# Patient Record
Sex: FEMALE | Race: WHITE | URBAN - METROPOLITAN AREA
[De-identification: names, ages, dates, MRNs, and addresses within clinical notes are randomized per-mention and may not be internally consistent; named-entity substitution may affect disease eponyms.]

---

## 2017-11-13 ENCOUNTER — OUTPATIENT (OUTPATIENT)
Dept: OUTPATIENT SERVICES | Facility: HOSPITAL | Age: 72
LOS: 1 days | Discharge: HOME | End: 2017-11-13

## 2017-11-13 DIAGNOSIS — Z12.31 ENCOUNTER FOR SCREENING MAMMOGRAM FOR MALIGNANT NEOPLASM OF BREAST: ICD-10-CM

## 2018-12-04 ENCOUNTER — OUTPATIENT (OUTPATIENT)
Dept: OUTPATIENT SERVICES | Facility: HOSPITAL | Age: 73
LOS: 1 days | Discharge: HOME | End: 2018-12-04

## 2018-12-04 DIAGNOSIS — Z12.31 ENCOUNTER FOR SCREENING MAMMOGRAM FOR MALIGNANT NEOPLASM OF BREAST: ICD-10-CM

## 2020-01-06 ENCOUNTER — OUTPATIENT (OUTPATIENT)
Dept: OUTPATIENT SERVICES | Facility: HOSPITAL | Age: 75
LOS: 1 days | Discharge: HOME | End: 2020-01-06
Payer: MEDICARE

## 2020-01-06 DIAGNOSIS — Z12.31 ENCOUNTER FOR SCREENING MAMMOGRAM FOR MALIGNANT NEOPLASM OF BREAST: ICD-10-CM

## 2020-01-06 PROCEDURE — 77067 SCR MAMMO BI INCL CAD: CPT | Mod: 26

## 2020-01-06 PROCEDURE — 77063 BREAST TOMOSYNTHESIS BI: CPT | Mod: 26

## 2021-01-26 ENCOUNTER — OUTPATIENT (OUTPATIENT)
Dept: OUTPATIENT SERVICES | Facility: HOSPITAL | Age: 76
LOS: 1 days | Discharge: HOME | End: 2021-01-26
Payer: MEDICARE

## 2021-01-26 DIAGNOSIS — Z12.31 ENCOUNTER FOR SCREENING MAMMOGRAM FOR MALIGNANT NEOPLASM OF BREAST: ICD-10-CM

## 2021-01-26 PROCEDURE — 77063 BREAST TOMOSYNTHESIS BI: CPT | Mod: 26

## 2021-01-26 PROCEDURE — 77067 SCR MAMMO BI INCL CAD: CPT | Mod: 26

## 2022-02-17 ENCOUNTER — OUTPATIENT (OUTPATIENT)
Dept: OUTPATIENT SERVICES | Facility: HOSPITAL | Age: 77
LOS: 1 days | Discharge: HOME | End: 2022-02-17
Payer: MEDICARE

## 2022-02-17 DIAGNOSIS — Z12.31 ENCOUNTER FOR SCREENING MAMMOGRAM FOR MALIGNANT NEOPLASM OF BREAST: ICD-10-CM

## 2022-02-17 PROCEDURE — 77063 BREAST TOMOSYNTHESIS BI: CPT | Mod: 26

## 2022-02-17 PROCEDURE — 77067 SCR MAMMO BI INCL CAD: CPT | Mod: 26

## 2022-03-03 ENCOUNTER — OUTPATIENT (OUTPATIENT)
Dept: OUTPATIENT SERVICES | Facility: HOSPITAL | Age: 77
LOS: 1 days | Discharge: HOME | End: 2022-03-03
Payer: MEDICARE

## 2022-03-03 DIAGNOSIS — R92.8 OTHER ABNORMAL AND INCONCLUSIVE FINDINGS ON DIAGNOSTIC IMAGING OF BREAST: ICD-10-CM

## 2022-03-03 PROCEDURE — G0279: CPT | Mod: 26

## 2022-03-03 PROCEDURE — 77065 DX MAMMO INCL CAD UNI: CPT | Mod: 26,RT

## 2022-03-10 ENCOUNTER — RESULT REVIEW (OUTPATIENT)
Age: 77
End: 2022-03-10

## 2022-03-10 ENCOUNTER — OUTPATIENT (OUTPATIENT)
Dept: OUTPATIENT SERVICES | Facility: HOSPITAL | Age: 77
LOS: 1 days | Discharge: HOME | End: 2022-03-10
Payer: MEDICARE

## 2022-03-10 PROCEDURE — 88305 TISSUE EXAM BY PATHOLOGIST: CPT | Mod: 26

## 2022-03-10 PROCEDURE — 76098 X-RAY EXAM SURGICAL SPECIMEN: CPT | Mod: 26

## 2022-03-10 PROCEDURE — 19081 BX BREAST 1ST LESION STRTCTC: CPT | Mod: RT

## 2022-03-11 LAB — SURGICAL PATHOLOGY STUDY: SIGNIFICANT CHANGE UP

## 2022-03-16 DIAGNOSIS — D24.1 BENIGN NEOPLASM OF RIGHT BREAST: ICD-10-CM

## 2022-03-16 DIAGNOSIS — R92.1 MAMMOGRAPHIC CALCIFICATION FOUND ON DIAGNOSTIC IMAGING OF BREAST: ICD-10-CM

## 2022-03-16 DIAGNOSIS — N64.2 ATROPHY OF BREAST: ICD-10-CM

## 2022-04-22 ENCOUNTER — TRANSCRIPTION ENCOUNTER (OUTPATIENT)
Age: 77
End: 2022-04-22

## 2022-08-07 ENCOUNTER — NON-APPOINTMENT (OUTPATIENT)
Age: 77
End: 2022-08-07

## 2022-09-08 ENCOUNTER — NON-APPOINTMENT (OUTPATIENT)
Age: 77
End: 2022-09-08

## 2022-09-09 ENCOUNTER — APPOINTMENT (OUTPATIENT)
Dept: ORTHOPEDIC SURGERY | Facility: CLINIC | Age: 77
End: 2022-09-09

## 2022-09-21 PROBLEM — Z00.00 ENCOUNTER FOR PREVENTIVE HEALTH EXAMINATION: Status: ACTIVE | Noted: 2022-09-21

## 2022-09-22 ENCOUNTER — APPOINTMENT (OUTPATIENT)
Dept: PLASTIC SURGERY | Facility: CLINIC | Age: 77
End: 2022-09-22

## 2022-09-22 VITALS — BODY MASS INDEX: 33.13 KG/M2 | WEIGHT: 180 LBS | HEIGHT: 62 IN

## 2022-09-22 DIAGNOSIS — Z78.9 OTHER SPECIFIED HEALTH STATUS: ICD-10-CM

## 2022-09-22 DIAGNOSIS — Z86.018 PERSONAL HISTORY OF OTHER BENIGN NEOPLASM: ICD-10-CM

## 2022-09-22 DIAGNOSIS — Z72.3 LACK OF PHYSICAL EXERCISE: ICD-10-CM

## 2022-09-22 DIAGNOSIS — D48.5 NEOPLASM OF UNCERTAIN BEHAVIOR OF SKIN: ICD-10-CM

## 2022-09-22 DIAGNOSIS — Z86.39 PERSONAL HISTORY OF OTHER ENDOCRINE, NUTRITIONAL AND METABOLIC DISEASE: ICD-10-CM

## 2022-09-22 DIAGNOSIS — Z86.79 PERSONAL HISTORY OF OTHER DISEASES OF THE CIRCULATORY SYSTEM: ICD-10-CM

## 2022-09-22 PROCEDURE — 99203 OFFICE O/P NEW LOW 30 MIN: CPT

## 2022-09-22 NOTE — PHYSICAL EXAM
[de-identified] : well developed elderly female, NAD [de-identified] : NC/AT [de-identified] : unlabored breathing [de-identified] : ANDRESR [de-identified] : Central chest with 1x0.6 cm macular slightly red and crusty skin lesion [de-identified] : soft, nontender

## 2022-09-22 NOTE — ASSESSMENT
[FreeTextEntry1] : 78 yo F with new central chest skin lesion. \par \par as above\par \par suggest removal of central chest inflamed lesion measuring approx 3mm x 5mm\par \par no prior h/o skin cancer\par nonsmoker\par type II DM\par \par Regarding the procedure, we discussed scarring, poor wound healing, bleeding, infection, need for additional surgery, and dissatisfaction with the outcome.  Also discussed possibility of keloid and/or hypertrophic scar formation as well as recurrence.  All questions were answered and risks understood.\par \par Due to COVID 19, pre-visit patient instructions were explained to the patient and their symptoms were checked upon arrival.  \par Masks were used by the health care providers and staff and the examination room was cleaned after the patient visit was completed.\par

## 2022-09-22 NOTE — HISTORY OF PRESENT ILLNESS
[FreeTextEntry1] : 76 yo F with PMHx of HTN, HLD, DM II and hemangioma resection who presents today for evaluation of skin lesion on her chest for the past several months causing discomfort and minor bleeding when her necklace wound get caught in it. Denies any h/o skin cancer. Denies any treatments or prior biopsy. \par \par \par Never smoker\par Retired

## 2022-12-16 ENCOUNTER — APPOINTMENT (OUTPATIENT)
Dept: PLASTIC SURGERY | Facility: CLINIC | Age: 77
End: 2022-12-16

## 2022-12-16 PROCEDURE — 11401 EXC TR-EXT B9+MARG 0.6-1 CM: CPT

## 2022-12-16 PROCEDURE — 13100 CMPLX RPR TRUNK 1.1-2.5 CM: CPT | Mod: 59

## 2022-12-21 NOTE — PROCEDURE
[FreeTextEntry6] : Patient is a 77 year old female with a central chest skin lesion measuring approximately 1.1 x 1cm.  \par \par The area was prepped and draped in the usual fashion.  Local anesthetic was administered using 1% lidocaine with epinephrine.\par \par Lesion was sharply excised.  Area was irrigated copiously.  Complex wound closure was performed in layers.  The wound measured approximately 1.5 cm.\par \par Sterile dressing applied.  \par \par Patient tolerated procedure well and understands post-op instructions.\par \par Sutures Used: 3-0 nylon\par \par Due to COVID 19, pre-visit patient instructions were explained to the patient and their symptoms were checked upon arrival.  \par Masks were used by the health care providers and staff and the examination room was cleaned after the patient visit was completed.\par

## 2022-12-29 ENCOUNTER — APPOINTMENT (OUTPATIENT)
Dept: PLASTIC SURGERY | Facility: CLINIC | Age: 77
End: 2022-12-29
Payer: MEDICARE

## 2022-12-29 PROCEDURE — 99212 OFFICE O/P EST SF 10 MIN: CPT

## 2022-12-30 LAB — CORE LAB BIOPSY: NORMAL

## 2022-12-30 NOTE — PHYSICAL EXAM
[de-identified] : well developed elderly female, NAD [de-identified] : unlabored breathing [de-identified] : ANDRESR [de-identified] : Central chest incision healing well, c/d/i, minimal swelling, no erythema or fluid collection, good cosmesis

## 2022-12-30 NOTE — DATA REVIEWED
[FreeTextEntry1] : Tissue Biopsy             Final\par \par No Documents Attached\par \par \par   Patient:   RADHA NARVAEZ\par \par \par Accession:                             [id number]\par \par Collected Date/Time:                   12/16/2022 11:24 EST\par Received Date/Time:                    12/19/2022 08:45 EST\par \par Surgical Pathology Report - Auth (Verified)\par \par Specimen(s) Submitted\par Central chest skin lesion\par \par Final Diagnosis\par   Central chest skin lesion:\par -  Actinic keratosis with crusting and lymphoid aggregates in the dermis.\par \par Verified by: Trisha Guerra M.D.\par (Electronic Signature)\par Reported on: 12/21/22 18:26 EST, [facility] [address]\par Phone: [phone]   Fax: [phone]\par _________________________________________________________________\par \par Comment\par \par Note: This case was reviewed in intradepartmental QA conference and the\par  diagnosis represents a consensus opinion.\par \par Clinical Information\par D48.5- Neoplasm of uncertain behavior of skin\par \par Perioperative Diagnosis\par Central chest skin lesion\par \par Gross Description\par Received in formalin labeled "central chest skin lesion".  The specimen\par  consists of 1 unoriented tan skin ellipse measuring 1.9 x 1.1 x 0.4 cm.\par   The specimen is inked, serially sectioned and entirely submitted.  (4\par  blocks)\par \par \par Specimen was received and underwent gross examination at [place] [address].\par \par 12/19/2022 12:34:45 EST   LB\par \par  \par \par  Ordered by: MARSHA HUGHES IV       Collected/Examined: [id number] 11:24AM       \par Verification Required       Stage: Final       \par  Performed at: [facility] (Med Director: Johnson Tejada)       Resulted: [id number] 06:26PM       Last Updated: 21Dec2022 06:26PM       Accession: [id number]

## 2022-12-30 NOTE — ASSESSMENT
[FreeTextEntry1] : 78 yo F with new central chest skin lesion now POD#13 s/p excision. Doing well. \par \par - sutures removed, steri strip applied\par - may shower\par - daily Aquaphor and scar cream use discussed\par - pathology reviewed - AK\par - post-op instructions reviewed and all her questions were answered\par - dermatologic surveillance discussed \par - f/u 2 months with Dr. Kimball. \par \par Due to COVID 19, pre-visit patient instructions were explained to the patient and their symptoms were checked upon arrival.  \par Masks were used by the health care providers and staff and the examination room was cleaned after the patient visit was completed.\par

## 2022-12-30 NOTE — HISTORY OF PRESENT ILLNESS
[FreeTextEntry1] : 78 yo F with PMHx of HTN, HLD, DM II and hemangioma resection who presents today for evaluation of skin lesion on her chest for the past several months causing discomfort and minor bleeding when her necklace gets caught in it. Denies any h/o skin cancer. Denies any treatments or prior biopsy. \par \par Never smoker\par Retired  \par \par Interval hx (12/29/22). Pt presents today POD#13 s/p office excision of central chest skin lesion. Doing well with no significant pain, f/c or bleeding.

## 2023-02-07 ENCOUNTER — APPOINTMENT (OUTPATIENT)
Dept: PLASTIC SURGERY | Facility: CLINIC | Age: 78
End: 2023-02-07
Payer: MEDICARE

## 2023-02-07 DIAGNOSIS — L57.0 ACTINIC KERATOSIS: ICD-10-CM

## 2023-02-07 PROCEDURE — 99212 OFFICE O/P EST SF 10 MIN: CPT

## 2023-02-07 NOTE — ASSESSMENT
[FreeTextEntry1] : 78 yo F with new central chest skin lesion now 7 weeks s/p excision. Doing well. \par \par - daily Aquaphor and scar cream use discussed\par - pathology reviewed - AK\par - post-op instructions reviewed and all her questions were answered\par - dermatologic surveillance discussed \par - f/u 2 months with Dr. Kimball. \par \par Due to COVID 19, pre-visit patient instructions were explained to the patient and their symptoms were checked upon arrival.  \par Masks were used by the health care providers and staff and the examination room was cleaned after the patient visit was completed.\par \par \par 2/7/2023\par central chest scar slightly wide\par scarguard\par massage\par \par contact info for [facility]\par \par f/u 4-6 months\par \par Due to COVID 19, pre-visit patient instructions were explained to the patient and their symptoms were checked upon arrival.  \par Masks were used by the health care providers and staff and the examination room was cleaned after the patient visit was completed.\par

## 2023-02-07 NOTE — HISTORY OF PRESENT ILLNESS
[FreeTextEntry1] : 76 yo F with PMHx of HTN, HLD, DM II and hemangioma resection who presents today for evaluation of skin lesion on her chest for the past several months causing discomfort and minor bleeding when her necklace gets caught in it. Denies any h/o skin cancer. Denies any treatments or prior biopsy. \par \par Never smoker\par Retired  \par \par Interval hx (12/29/22). Pt presents today POD#13 s/p office excision of central chest skin lesion. Doing well with no significant pain, f/c or bleeding. \par \par Interval hx (2/7/23). Pt presents today 7 weeks s/p office excision of central chest AK. Doing well.

## 2023-02-07 NOTE — DATA REVIEWED
[FreeTextEntry1] : Tissue Biopsy             Final\par \par No Documents Attached\par \par \par   Patient:   RADHA NARVAEZ\par \par \par Accession:                             [id number]\par \par Collected Date/Time:                   12/16/2022 11:24 EST\par Received Date/Time:                    12/19/2022 08:45 EST\par \par Surgical Pathology Report - Auth (Verified)\par \par Specimen(s) Submitted\par Central chest skin lesion\par \par Final Diagnosis\par   Central chest skin lesion:\par -  Actinic keratosis with crusting and lymphoid aggregates in the dermis.\par \par Verified by: Trisha Guerra M.D.\par (Electronic Signature)\par Reported on: 12/21/22 18:26 EST, [facility],\[place] [address]\par Phone: [phone]   Fax: [phone]\par _________________________________________________________________\par \par Comment\par \par Note: This case was reviewed in intradepartmental QA conference and the\par  diagnosis represents a consensus opinion.\par \par Clinical Information\par D48.5- Neoplasm of uncertain behavior of skin\par \par Perioperative Diagnosis\par Central chest skin lesion\par \par Gross Description\par Received in formalin labeled "central chest skin lesion".  The specimen\par  consists of 1 unoriented tan skin ellipse measuring 1.9 x 1.1 x 0.4 cm.\par   The specimen is inked, serially sectioned and entirely submitted.  (4\par  blocks)\par \par \par Specimen was received and underwent gross examination at [place] [address].\par \par 12/19/2022 12:34:45 EST   LB\par \par  \par \par  Ordered by: MARSHA HUGHES IV       Collected/Examined: [id number] 11:24AM       \par Verification Required       Stage: Final       \par  Performed at: [facility] (Med Director: Johnson Tejada)       Resulted: [id number] 06:26PM       Last Updated: 21Dec2022 06:26PM       Accession: [id number]

## 2023-02-07 NOTE — PHYSICAL EXAM
[de-identified] : well developed elderly female, NAD [de-identified] : unlabored breathing [de-identified] : ANDRESR [de-identified] : Central chest incision healing well, c/d/i, minimal swelling, no erythema or fluid collection, good cosmesis

## 2023-04-04 ENCOUNTER — OUTPATIENT (OUTPATIENT)
Dept: OUTPATIENT SERVICES | Facility: HOSPITAL | Age: 78
LOS: 1 days | End: 2023-04-04
Payer: MEDICARE

## 2023-04-04 DIAGNOSIS — Z00.8 ENCOUNTER FOR OTHER GENERAL EXAMINATION: ICD-10-CM

## 2023-04-04 DIAGNOSIS — Z12.31 ENCOUNTER FOR SCREENING MAMMOGRAM FOR MALIGNANT NEOPLASM OF BREAST: ICD-10-CM

## 2023-04-04 PROCEDURE — 77067 SCR MAMMO BI INCL CAD: CPT

## 2023-04-04 PROCEDURE — 77063 BREAST TOMOSYNTHESIS BI: CPT | Mod: 26

## 2023-04-04 PROCEDURE — 77067 SCR MAMMO BI INCL CAD: CPT | Mod: 26

## 2023-04-04 PROCEDURE — 77063 BREAST TOMOSYNTHESIS BI: CPT

## 2023-04-05 DIAGNOSIS — Z12.31 ENCOUNTER FOR SCREENING MAMMOGRAM FOR MALIGNANT NEOPLASM OF BREAST: ICD-10-CM

## 2024-05-22 ENCOUNTER — OUTPATIENT (OUTPATIENT)
Dept: OUTPATIENT SERVICES | Facility: HOSPITAL | Age: 79
LOS: 1 days | End: 2024-05-22
Payer: MEDICARE

## 2024-05-22 DIAGNOSIS — Z12.31 ENCOUNTER FOR SCREENING MAMMOGRAM FOR MALIGNANT NEOPLASM OF BREAST: ICD-10-CM

## 2024-05-22 PROCEDURE — 77063 BREAST TOMOSYNTHESIS BI: CPT | Mod: 26

## 2024-05-22 PROCEDURE — 77067 SCR MAMMO BI INCL CAD: CPT

## 2024-05-22 PROCEDURE — 77067 SCR MAMMO BI INCL CAD: CPT | Mod: 26

## 2024-05-22 PROCEDURE — 77063 BREAST TOMOSYNTHESIS BI: CPT

## 2024-05-23 DIAGNOSIS — Z12.31 ENCOUNTER FOR SCREENING MAMMOGRAM FOR MALIGNANT NEOPLASM OF BREAST: ICD-10-CM

## 2025-05-21 ENCOUNTER — OUTPATIENT (OUTPATIENT)
Dept: OUTPATIENT SERVICES | Facility: HOSPITAL | Age: 80
LOS: 1 days | End: 2025-05-21
Payer: MEDICARE

## 2025-05-21 DIAGNOSIS — Z12.31 ENCOUNTER FOR SCREENING MAMMOGRAM FOR MALIGNANT NEOPLASM OF BREAST: ICD-10-CM

## 2025-05-21 PROCEDURE — 77067 SCR MAMMO BI INCL CAD: CPT | Mod: 26

## 2025-05-21 PROCEDURE — 77063 BREAST TOMOSYNTHESIS BI: CPT

## 2025-05-21 PROCEDURE — 77067 SCR MAMMO BI INCL CAD: CPT

## 2025-05-21 PROCEDURE — 77063 BREAST TOMOSYNTHESIS BI: CPT | Mod: 26

## 2025-05-22 DIAGNOSIS — Z12.31 ENCOUNTER FOR SCREENING MAMMOGRAM FOR MALIGNANT NEOPLASM OF BREAST: ICD-10-CM
